# Patient Record
Sex: MALE | Race: WHITE | Employment: OTHER | URBAN - METROPOLITAN AREA
[De-identification: names, ages, dates, MRNs, and addresses within clinical notes are randomized per-mention and may not be internally consistent; named-entity substitution may affect disease eponyms.]

---

## 2017-01-01 ENCOUNTER — LAB ENCOUNTER (OUTPATIENT)
Dept: LAB | Age: 72
End: 2017-01-01
Attending: INTERNAL MEDICINE
Payer: MEDICARE

## 2017-01-01 DIAGNOSIS — C83.38 DIFFUSE LARGE B-CELL LYMPHOMA OF LYMPH NODES OF MULTIPLE REGIONS (HCC): ICD-10-CM

## 2017-01-01 PROCEDURE — 88185 FLOWCYTOMETRY/TC ADD-ON: CPT | Performed by: INTERNAL MEDICINE

## 2017-01-01 PROCEDURE — 88237 TISSUE CULTURE BONE MARROW: CPT

## 2017-01-01 PROCEDURE — 88307 TISSUE EXAM BY PATHOLOGIST: CPT | Performed by: INTERNAL MEDICINE

## 2017-01-01 PROCEDURE — 88184 FLOWCYTOMETRY/ TC 1 MARKER: CPT | Performed by: INTERNAL MEDICINE

## 2017-01-01 PROCEDURE — 88341 IMHCHEM/IMCYTCHM EA ADD ANTB: CPT

## 2017-01-01 PROCEDURE — 88275 CYTOGENETICS 100-300: CPT | Performed by: INTERNAL MEDICINE

## 2017-01-01 PROCEDURE — 88271 CYTOGENETICS DNA PROBE: CPT | Performed by: INTERNAL MEDICINE

## 2017-01-01 PROCEDURE — 88342 IMHCHEM/IMCYTCHM 1ST ANTB: CPT | Performed by: INTERNAL MEDICINE

## 2017-01-01 PROCEDURE — 88341 IMHCHEM/IMCYTCHM EA ADD ANTB: CPT | Performed by: INTERNAL MEDICINE

## 2017-01-01 PROCEDURE — 36415 COLL VENOUS BLD VENIPUNCTURE: CPT | Performed by: INTERNAL MEDICINE

## 2017-01-01 PROCEDURE — 88313 SPECIAL STAINS GROUP 2: CPT

## 2017-01-01 PROCEDURE — 85097 BONE MARROW INTERPRETATION: CPT

## 2017-01-01 PROCEDURE — 88311 DECALCIFY TISSUE: CPT

## 2017-01-01 PROCEDURE — 88342 IMHCHEM/IMCYTCHM 1ST ANTB: CPT

## 2017-01-01 PROCEDURE — 88305 TISSUE EXAM BY PATHOLOGIST: CPT

## 2017-01-01 PROCEDURE — 88264 CHROMOSOME ANALYSIS 20-25: CPT

## 2017-03-21 VITALS — WEIGHT: 150 LBS | BODY MASS INDEX: 23.54 KG/M2 | HEIGHT: 67 IN

## 2017-03-21 RX ORDER — FLUORIDE TOOTHPASTE
2 TOOTHPASTE DENTAL
COMMUNITY

## 2017-03-23 ENCOUNTER — HOSPITAL ENCOUNTER (OUTPATIENT)
Dept: INTERVENTIONAL RADIOLOGY/VASCULAR | Facility: HOSPITAL | Age: 72
Discharge: HOME | End: 2017-03-23
Attending: SURGERY
Payer: MEDICARE

## 2017-03-30 ENCOUNTER — HOSPITAL ENCOUNTER (OUTPATIENT)
Dept: INTERVENTIONAL RADIOLOGY/VASCULAR | Facility: HOSPITAL | Age: 72
Discharge: HOME | End: 2017-03-30
Attending: SURGERY | Admitting: SURGERY
Payer: MEDICARE

## 2017-03-30 VITALS
DIASTOLIC BLOOD PRESSURE: 74 MMHG | RESPIRATION RATE: 23 BRPM | TEMPERATURE: 97 F | SYSTOLIC BLOOD PRESSURE: 136 MMHG | HEART RATE: 70 BPM | OXYGEN SATURATION: 95 %

## 2017-03-30 DIAGNOSIS — I73.9 CLAUDICATION (HCC): ICD-10-CM

## 2017-03-30 PROCEDURE — 047J3DZ DILATION OF LEFT EXTERNAL ILIAC ARTERY WITH INTRALUMINAL DEVICE, PERCUTANEOUS APPROACH: ICD-10-PCS | Performed by: SURGERY

## 2017-03-30 PROCEDURE — 99153 MOD SED SAME PHYS/QHP EA: CPT

## 2017-03-30 PROCEDURE — 76942 ECHO GUIDE FOR BIOPSY: CPT

## 2017-03-30 PROCEDURE — 93005 ELECTROCARDIOGRAM TRACING: CPT

## 2017-03-30 PROCEDURE — 93010 ELECTROCARDIOGRAM REPORT: CPT | Performed by: INTERNAL MEDICINE

## 2017-03-30 PROCEDURE — 37221 HC TRANSLUMINAL ANGIOPLASTY W STENT ILIAC UNILAT INIT: CPT

## 2017-03-30 PROCEDURE — 99152 MOD SED SAME PHYS/QHP 5/>YRS: CPT

## 2017-03-30 RX ORDER — CLOPIDOGREL BISULFATE 75 MG/1
TABLET ORAL
Status: COMPLETED
Start: 2017-03-30 | End: 2017-03-30

## 2017-03-30 RX ORDER — MIDAZOLAM HYDROCHLORIDE 1 MG/ML
INJECTION INTRAMUSCULAR; INTRAVENOUS
Status: COMPLETED
Start: 2017-03-30 | End: 2017-03-30

## 2017-03-30 RX ORDER — HEPARIN SODIUM 5000 [USP'U]/ML
INJECTION, SOLUTION INTRAVENOUS; SUBCUTANEOUS
Status: COMPLETED
Start: 2017-03-30 | End: 2017-03-30

## 2017-03-30 RX ORDER — SODIUM CHLORIDE 9 MG/ML
INJECTION, SOLUTION INTRAVENOUS CONTINUOUS
Status: DISCONTINUED | OUTPATIENT
Start: 2017-03-30 | End: 2017-03-30

## 2017-03-30 RX ORDER — LIDOCAINE HYDROCHLORIDE 10 MG/ML
INJECTION, SOLUTION INFILTRATION; PERINEURAL
Status: COMPLETED
Start: 2017-03-30 | End: 2017-03-30

## 2017-03-30 NOTE — OPERATIVE REPORT
[facility]  Operative Report     Abdelrahman Puls Location: OR   CSN [id number] MRN [id number]   Admission Date 3/30/2017 Operation Date 3/30/2017   Attending Physician Nino Romo MD Operating Physician Liv Carroll MD       Pre-Operative Diag stenosis. I then selected the left external iliac and used a Glidewire and advanced a wire across the stenosis. I advanced a glide catheter the level of the superficial femoral artery over the Glidewire and exchanged it for a J-wire.   Patient was then he

## 2017-03-30 NOTE — BRIEF OP NOTE
[facility] [address] [facility]  Brief Op Note     Ane Holdrege Location: OR   [facility] [id number] MRN [id number]   Admission Date 3/30/2017 Operation Date 3/30/2017   Attending Physician Adriane Mclaughlin MD Operating Physician Lissy Blount MD

## 2017-03-30 NOTE — PROGRESS NOTES
Received patient from cath lab. VSS. Right groin SDI. Denies pain.  at bedside spoke with pt and family.  Pedals palable

## 2017-03-30 NOTE — PROGRESS NOTES
Discharge to home. Information about plavix given to patient and family, info about perclose along with wound care instructions. To [place] per [employer], family driving home.

## 2017-05-15 NOTE — H&P
[facility]  Vascular Surgery  Kuldeep Leary Patient Status:  Outpatient in a Bed    1945 MRN [id number]   Location [facility] Attending No att. providers found   [address] Day # 0 PCP Lynne Fowler MD       History Comment Procedure: ESOPHAGOGASTRODUODENOSCOPY, COLONOSCOPY, POSSIBLE BIOPSY, POSSIBLE POLYPECTOMY 65955,43310;  Surgeon: Rodney Gentile MD;  Location: [place] [address]; W/DIRECTED SUBMUCOSA IN Exam:  /74 mmHg  Pulse 70  Temp(Src) 97.3 °F (36.3 °C) (Temporal)  Resp 23  SpO2 95%  GENERAL: alert and orientated X 3, well developed, well nourished, in no apparent distress  HEENT: ears and throat are clear  NECK: supple, no lymphadenopathy, thyr

## 2017-12-19 PROBLEM — C83.38 DIFFUSE LARGE B-CELL LYMPHOMA OF LYMPH NODES OF MULTIPLE REGIONS (HCC): Status: ACTIVE | Noted: 2017-01-01

## 2018-01-01 ENCOUNTER — APPOINTMENT (OUTPATIENT)
Dept: GENERAL RADIOLOGY | Facility: HOSPITAL | Age: 73
DRG: 641 | End: 2018-01-01
Attending: EMERGENCY MEDICINE
Payer: MEDICARE

## 2018-01-01 ENCOUNTER — HOSPITAL ENCOUNTER (INPATIENT)
Facility: HOSPITAL | Age: 73
LOS: 2 days | Discharge: HOME | DRG: 378 | End: 2018-01-01
Attending: HOSPITALIST | Admitting: HOSPITALIST
Payer: MEDICARE

## 2018-01-01 ENCOUNTER — ANESTHESIA EVENT (OUTPATIENT)
Dept: CARDIAC SURGERY | Facility: HOSPITAL | Age: 73
End: 2018-01-01

## 2018-01-01 ENCOUNTER — HOSPITAL ENCOUNTER (OUTPATIENT)
Dept: ULTRASOUND IMAGING | Facility: HOSPITAL | Age: 73
Discharge: HOME | End: 2018-01-01
Attending: INTERNAL MEDICINE
Payer: MEDICARE

## 2018-01-01 ENCOUNTER — SURGERY (OUTPATIENT)
Age: 73
End: 2018-01-01

## 2018-01-01 ENCOUNTER — ANESTHESIA EVENT (OUTPATIENT)
Dept: ENDOSCOPY | Facility: HOSPITAL | Age: 73
End: 2018-01-01

## 2018-01-01 ENCOUNTER — ANESTHESIA (OUTPATIENT)
Dept: ENDOSCOPY | Facility: HOSPITAL | Age: 73
End: 2018-01-01

## 2018-01-01 ENCOUNTER — ANESTHESIA (OUTPATIENT)
Dept: CARDIAC SURGERY | Facility: HOSPITAL | Age: 73
End: 2018-01-01

## 2018-01-01 ENCOUNTER — HOSPITAL ENCOUNTER (EMERGENCY)
Facility: HOSPITAL | Age: 73
End: 2018-01-01
Attending: STUDENT IN AN ORGANIZED HEALTH CARE EDUCATION/TRAINING PROGRAM
Payer: MEDICARE

## 2018-01-01 ENCOUNTER — APPOINTMENT (OUTPATIENT)
Dept: LAB | Facility: HOSPITAL | Age: 73
End: 2018-01-01
Attending: INTERNAL MEDICINE
Payer: MEDICARE

## 2018-01-01 ENCOUNTER — HOSPITAL ENCOUNTER (INPATIENT)
Facility: HOSPITAL | Age: 73
LOS: 1 days | Discharge: HOME | DRG: 269 | End: 2018-01-01
Attending: SURGERY | Admitting: SURGERY
Payer: MEDICARE

## 2018-01-01 ENCOUNTER — HOSPITAL ENCOUNTER (INPATIENT)
Facility: HOSPITAL | Age: 73
LOS: 1 days | Discharge: HOME | DRG: 641 | End: 2018-01-01
Attending: EMERGENCY MEDICINE | Admitting: INTERNAL MEDICINE
Payer: MEDICARE

## 2018-01-01 VITALS
HEART RATE: 83 BPM | RESPIRATION RATE: 22 BRPM | OXYGEN SATURATION: 90 % | SYSTOLIC BLOOD PRESSURE: 114 MMHG | DIASTOLIC BLOOD PRESSURE: 48 MMHG | TEMPERATURE: 98 F

## 2018-01-01 VITALS
RESPIRATION RATE: 18 BRPM | BODY MASS INDEX: 23.77 KG/M2 | TEMPERATURE: 98 F | HEIGHT: 66 IN | WEIGHT: 147.88 LBS | SYSTOLIC BLOOD PRESSURE: 120 MMHG | HEART RATE: 100 BPM | DIASTOLIC BLOOD PRESSURE: 53 MMHG | OXYGEN SATURATION: 97 %

## 2018-01-01 VITALS
BODY MASS INDEX: 25.22 KG/M2 | RESPIRATION RATE: 28 BRPM | HEART RATE: 86 BPM | HEIGHT: 66 IN | OXYGEN SATURATION: 97 % | TEMPERATURE: 98 F | SYSTOLIC BLOOD PRESSURE: 101 MMHG | DIASTOLIC BLOOD PRESSURE: 60 MMHG | WEIGHT: 156.94 LBS

## 2018-01-01 VITALS
RESPIRATION RATE: 22 BRPM | TEMPERATURE: 97 F | HEART RATE: 82 BPM | OXYGEN SATURATION: 98 % | BODY MASS INDEX: 23.14 KG/M2 | WEIGHT: 144 LBS | HEIGHT: 66 IN | SYSTOLIC BLOOD PRESSURE: 155 MMHG | DIASTOLIC BLOOD PRESSURE: 88 MMHG

## 2018-01-01 DIAGNOSIS — C83.38 DIFFUSE LARGE B-CELL LYMPHOMA OF LYMPH NODES OF MULTIPLE REGIONS (HCC): Primary | ICD-10-CM

## 2018-01-01 DIAGNOSIS — E86.0 DEHYDRATION: Primary | ICD-10-CM

## 2018-01-01 DIAGNOSIS — C83.38 DIFFUSE LARGE B-CELL LYMPHOMA OF LYMPH NODES OF MULTIPLE REGIONS (HCC): ICD-10-CM

## 2018-01-01 DIAGNOSIS — E61.1 IRON DEFICIENCY: Primary | ICD-10-CM

## 2018-01-01 DIAGNOSIS — I46.9 CARDIOPULMONARY ARREST (HCC): Primary | ICD-10-CM

## 2018-01-01 LAB
ALBUMIN SERPL-MCNC: 3.1 G/DL (ref 3.5–4.8)
ALP LIVER SERPL-CCNC: 69 U/L (ref 45–117)
ALT SERPL-CCNC: 33 U/L (ref 17–63)
ANTIBODY SCREEN: NEGATIVE
AST SERPL-CCNC: 36 U/L (ref 15–41)
ATRIAL RATE: 55 BPM
ATRIAL RATE: 56 BPM
ATRIAL RATE: 87 BPM
BASOPHILS # BLD AUTO: 0 X10(3) UL (ref 0–0.1)
BASOPHILS # BLD AUTO: 0 X10(3) UL (ref 0–0.1)
BASOPHILS NFR BLD AUTO: 0 %
BASOPHILS NFR BLD AUTO: 0 %
BILIRUB SERPL-MCNC: 0.4 MG/DL (ref 0.1–2)
BILIRUB UR QL STRIP.AUTO: NEGATIVE
BLOOD TYPE BARCODE: 6200
BUN BLD-MCNC: 20 MG/DL (ref 8–20)
BUN BLD-MCNC: 30 MG/DL (ref 8–20)
BUN BLD-MCNC: 33 MG/DL (ref 8–20)
CALCIUM BLD-MCNC: 7.9 MG/DL (ref 8.3–10.3)
CALCIUM BLD-MCNC: 7.9 MG/DL (ref 8.3–10.3)
CALCIUM BLD-MCNC: 8.9 MG/DL (ref 8.3–10.3)
CHLORIDE: 107 MMOL/L (ref 101–111)
CHLORIDE: 111 MMOL/L (ref 101–111)
CHLORIDE: 99 MMOL/L (ref 101–111)
CO2: 22 MMOL/L (ref 22–32)
CO2: 23 MMOL/L (ref 22–32)
CO2: 24 MMOL/L (ref 22–32)
COLOR UR AUTO: YELLOW
CREAT BLD-MCNC: 0.67 MG/DL (ref 0.7–1.3)
CREAT BLD-MCNC: 0.81 MG/DL (ref 0.7–1.3)
CREAT BLD-MCNC: 1.16 MG/DL (ref 0.7–1.3)
EOSINOPHIL # BLD AUTO: 0.08 X10(3) UL (ref 0–0.3)
EOSINOPHIL # BLD AUTO: 0.08 X10(3) UL (ref 0–0.3)
EOSINOPHIL NFR BLD AUTO: 0.9 %
EOSINOPHIL NFR BLD AUTO: 1.1 %
ERYTHROCYTE [DISTWIDTH] IN BLOOD BY AUTOMATED COUNT: 12.7 % (ref 11.5–16)
ERYTHROCYTE [DISTWIDTH] IN BLOOD BY AUTOMATED COUNT: 12.9 % (ref 11.5–16)
ERYTHROCYTE [DISTWIDTH] IN BLOOD BY AUTOMATED COUNT: 13.2 % (ref 11.5–16)
ERYTHROCYTE [DISTWIDTH] IN BLOOD BY AUTOMATED COUNT: 13.2 % (ref 11.5–16)
GLUCOSE BLD-MCNC: 117 MG/DL (ref 70–99)
GLUCOSE BLD-MCNC: 169 MG/DL (ref 70–99)
GLUCOSE BLD-MCNC: 89 MG/DL (ref 70–99)
GLUCOSE BLD-MCNC: 99 MG/DL (ref 70–99)
GLUCOSE UR STRIP.AUTO-MCNC: 50 MG/DL
HCT VFR BLD AUTO: 28.8 % (ref 37–53)
HCT VFR BLD AUTO: 29.2 % (ref 37–53)
HCT VFR BLD AUTO: 32.1 % (ref 37–53)
HCT VFR BLD AUTO: 37.1 % (ref 37–53)
HGB BLD-MCNC: 10.7 G/DL (ref 13–17)
HGB BLD-MCNC: 12.2 G/DL (ref 13–17)
HGB BLD-MCNC: 9.7 G/DL (ref 13–17)
HGB BLD-MCNC: 9.8 G/DL (ref 13–17)
IMMATURE GRANULOCYTE COUNT: 0.02 X10(3) UL (ref 0–1)
IMMATURE GRANULOCYTE COUNT: 0.03 X10(3) UL (ref 0–1)
IMMATURE GRANULOCYTE RATIO %: 0.3 %
IMMATURE GRANULOCYTE RATIO %: 0.3 %
INR BLD: 1.18 (ref 0.89–1.11)
ISTAT ACTIVATED CLOTTING TIME: 125 SECONDS (ref 74–137)
ISTAT BLOOD GAS BASE EXCESS: 0 MMOL/L
ISTAT BLOOD GAS HCO3: 25.7 MEQ/L (ref 22–26)
ISTAT BLOOD GAS O2 SATURATION: 100 % (ref 92–100)
ISTAT BLOOD GAS PCO2: 45 MMHG (ref 35–45)
ISTAT BLOOD GAS PH: 7.37 (ref 7.35–7.45)
ISTAT BLOOD GAS PO2: 346 MMHG (ref 80–105)
ISTAT BLOOD GAS TCO2: 27 MMOL/L (ref 22–32)
ISTAT HEMATOCRIT: 32 % (ref 37–53)
ISTAT IONIZED CALCIUM: 1.19 MMOL/L (ref 1.12–1.32)
ISTAT POTASSIUM: 4.1 MMOL/L (ref 3.6–5.1)
ISTAT SODIUM: 146 MMOL/L (ref 136–144)
KETONES UR STRIP.AUTO-MCNC: NEGATIVE MG/DL
LACTIC ACID: 1.6 MMOL/L (ref 0.5–2)
LACTIC ACID: 2 MMOL/L (ref 0.5–2)
LACTIC ACID: 3.9 MMOL/L (ref 0.5–2)
LEUKOCYTE ESTERASE UR QL STRIP.AUTO: NEGATIVE
LYMPHOCYTES # BLD AUTO: 0.53 X10(3) UL (ref 0.9–4)
LYMPHOCYTES # BLD AUTO: 0.65 X10(3) UL (ref 0.9–4)
LYMPHOCYTES NFR BLD AUTO: 7.4 %
LYMPHOCYTES NFR BLD AUTO: 7.5 %
M PROTEIN MFR SERPL ELPH: 6.6 G/DL (ref 6.1–8.3)
MCH RBC QN AUTO: 30.3 PG (ref 27–33.2)
MCH RBC QN AUTO: 31.1 PG (ref 27–33.2)
MCH RBC QN AUTO: 31.5 PG (ref 27–33.2)
MCH RBC QN AUTO: 32.2 PG (ref 27–33.2)
MCHC RBC AUTO-ENTMCNC: 32.9 G/DL (ref 31–37)
MCHC RBC AUTO-ENTMCNC: 33.2 G/DL (ref 31–37)
MCHC RBC AUTO-ENTMCNC: 33.3 G/DL (ref 31–37)
MCHC RBC AUTO-ENTMCNC: 34 G/DL (ref 31–37)
MCV RBC AUTO: 91.4 FL (ref 80–99)
MCV RBC AUTO: 92.1 FL (ref 80–99)
MCV RBC AUTO: 94.8 FL (ref 80–99)
MCV RBC AUTO: 96.7 FL (ref 80–99)
MONOCYTES # BLD AUTO: 0.03 X10(3) UL (ref 0.1–0.6)
MONOCYTES # BLD AUTO: 0.03 X10(3) UL (ref 0.1–0.6)
MONOCYTES NFR BLD AUTO: 0.3 %
MONOCYTES NFR BLD AUTO: 0.4 %
NEUTROPHIL ABS PRELIM: 6.55 X10 (3) UL (ref 1.3–6.7)
NEUTROPHIL ABS PRELIM: 7.86 X10 (3) UL (ref 1.3–6.7)
NEUTROPHILS # BLD AUTO: 6.55 X10(3) UL (ref 1.3–6.7)
NEUTROPHILS # BLD AUTO: 7.86 X10(3) UL (ref 1.3–6.7)
NEUTROPHILS NFR BLD AUTO: 90.8 %
NEUTROPHILS NFR BLD AUTO: 91 %
NITRITE UR QL STRIP.AUTO: NEGATIVE
P AXIS: 66 DEGREES
P AXIS: 75 DEGREES
P AXIS: 78 DEGREES
P-R INTERVAL: 148 MS
P-R INTERVAL: 166 MS
P-R INTERVAL: 174 MS
PH UR STRIP.AUTO: 6 [PH] (ref 4.5–8)
PLATELET # BLD AUTO: 152 10(3)UL (ref 150–450)
PLATELET # BLD AUTO: 162 10(3)UL (ref 150–450)
PLATELET # BLD AUTO: 163 10(3)UL (ref 150–450)
PLATELET # BLD AUTO: 225 10(3)UL (ref 150–450)
POTASSIUM SERPL-SCNC: 3.7 MMOL/L (ref 3.6–5.1)
POTASSIUM SERPL-SCNC: 3.8 MMOL/L (ref 3.6–5.1)
POTASSIUM SERPL-SCNC: 4.6 MMOL/L (ref 3.6–5.1)
PROT UR STRIP.AUTO-MCNC: 30 MG/DL
PSA SERPL DL<=0.01 NG/ML-MCNC: 15.1 SECONDS (ref 12–14.3)
Q-T INTERVAL: 380 MS
Q-T INTERVAL: 466 MS
Q-T INTERVAL: 472 MS
QRS DURATION: 88 MS
QRS DURATION: 92 MS
QRS DURATION: 98 MS
QTC CALCULATION (BEZET): 449 MS
QTC CALCULATION (BEZET): 451 MS
QTC CALCULATION (BEZET): 457 MS
R AXIS: 23 DEGREES
R AXIS: 49 DEGREES
R AXIS: 62 DEGREES
RBC # BLD AUTO: 3.08 X10(6)UL (ref 3.8–5.8)
RBC # BLD AUTO: 3.15 X10(6)UL (ref 3.8–5.8)
RBC # BLD AUTO: 3.32 X10(6)UL (ref 3.8–5.8)
RBC # BLD AUTO: 4.03 X10(6)UL (ref 3.8–5.8)
RBC UR QL AUTO: NEGATIVE
RED CELL DISTRIBUTION WIDTH-SD: 42.3 FL (ref 35.1–46.3)
RED CELL DISTRIBUTION WIDTH-SD: 43 FL (ref 35.1–46.3)
RED CELL DISTRIBUTION WIDTH-SD: 45.8 FL (ref 35.1–46.3)
RED CELL DISTRIBUTION WIDTH-SD: 46.6 FL (ref 35.1–46.3)
RH BLOOD TYPE: POSITIVE
SODIUM SERPL-SCNC: 134 MMOL/L (ref 136–144)
SODIUM SERPL-SCNC: 140 MMOL/L (ref 136–144)
SODIUM SERPL-SCNC: 142 MMOL/L (ref 136–144)
SP GR UR STRIP.AUTO: 1.01 (ref 1–1.03)
T AXIS: 71 DEGREES
T AXIS: 75 DEGREES
T AXIS: 76 DEGREES
TROPONIN: <0.046 NG/ML (ref ?–0.05)
UROBILINOGEN UR STRIP.AUTO-MCNC: <2 MG/DL
VENTRICULAR RATE: 55 BPM
VENTRICULAR RATE: 56 BPM
VENTRICULAR RATE: 87 BPM
WBC # BLD AUTO: 10.7 X10(3) UL (ref 4–13)
WBC # BLD AUTO: 10.7 X10(3) UL (ref 4–13)
WBC # BLD AUTO: 7.2 X10(3) UL (ref 4–13)
WBC # BLD AUTO: 8.7 X10(3) UL (ref 4–13)

## 2018-01-01 PROCEDURE — 84132 ASSAY OF SERUM POTASSIUM: CPT

## 2018-01-01 PROCEDURE — 85027 COMPLETE CBC AUTOMATED: CPT | Performed by: SURGERY

## 2018-01-01 PROCEDURE — 96361 HYDRATE IV INFUSION ADD-ON: CPT

## 2018-01-01 PROCEDURE — 88184 FLOWCYTOMETRY/ TC 1 MARKER: CPT | Performed by: INTERNAL MEDICINE

## 2018-01-01 PROCEDURE — 85347 COAGULATION TIME ACTIVATED: CPT

## 2018-01-01 PROCEDURE — C9113 INJ PANTOPRAZOLE SODIUM, VIA: HCPCS | Performed by: INTERNAL MEDICINE

## 2018-01-01 PROCEDURE — 80048 BASIC METABOLIC PNL TOTAL CA: CPT | Performed by: INTERNAL MEDICINE

## 2018-01-01 PROCEDURE — 93005 ELECTROCARDIOGRAM TRACING: CPT

## 2018-01-01 PROCEDURE — 86706 HEP B SURFACE ANTIBODY: CPT | Performed by: INTERNAL MEDICINE

## 2018-01-01 PROCEDURE — 30233N1 TRANSFUSION OF NONAUTOLOGOUS RED BLOOD CELLS INTO PERIPHERAL VEIN, PERCUTANEOUS APPROACH: ICD-10-PCS | Performed by: INTERNAL MEDICINE

## 2018-01-01 PROCEDURE — 85045 AUTOMATED RETICULOCYTE COUNT: CPT | Performed by: INTERNAL MEDICINE

## 2018-01-01 PROCEDURE — 86706 HEP B SURFACE ANTIBODY: CPT | Performed by: PHYSICIAN ASSISTANT

## 2018-01-01 PROCEDURE — 85610 PROTHROMBIN TIME: CPT

## 2018-01-01 PROCEDURE — 92950 HEART/LUNG RESUSCITATION CPR: CPT

## 2018-01-01 PROCEDURE — 5A12012 PERFORMANCE OF CARDIAC OUTPUT, SINGLE, MANUAL: ICD-10-PCS | Performed by: STUDENT IN AN ORGANIZED HEALTH CARE EDUCATION/TRAINING PROGRAM

## 2018-01-01 PROCEDURE — 87340 HEPATITIS B SURFACE AG IA: CPT | Performed by: INTERNAL MEDICINE

## 2018-01-01 PROCEDURE — 82728 ASSAY OF FERRITIN: CPT | Performed by: INTERNAL MEDICINE

## 2018-01-01 PROCEDURE — 86850 RBC ANTIBODY SCREEN: CPT | Performed by: INTERNAL MEDICINE

## 2018-01-01 PROCEDURE — 99285 EMERGENCY DEPT VISIT HI MDM: CPT

## 2018-01-01 PROCEDURE — 84295 ASSAY OF SERUM SODIUM: CPT

## 2018-01-01 PROCEDURE — 86704 HEP B CORE ANTIBODY TOTAL: CPT | Performed by: INTERNAL MEDICINE

## 2018-01-01 PROCEDURE — 83605 ASSAY OF LACTIC ACID: CPT | Performed by: EMERGENCY MEDICINE

## 2018-01-01 PROCEDURE — 82330 ASSAY OF CALCIUM: CPT

## 2018-01-01 PROCEDURE — 85025 COMPLETE CBC W/AUTO DIFF WBC: CPT | Performed by: INTERNAL MEDICINE

## 2018-01-01 PROCEDURE — 88172 CYTP DX EVAL FNA 1ST EA SITE: CPT | Performed by: INTERNAL MEDICINE

## 2018-01-01 PROCEDURE — 85025 COMPLETE CBC W/AUTO DIFF WBC: CPT | Performed by: EMERGENCY MEDICINE

## 2018-01-01 PROCEDURE — 80053 COMPREHEN METABOLIC PANEL: CPT | Performed by: INTERNAL MEDICINE

## 2018-01-01 PROCEDURE — 86920 COMPATIBILITY TEST SPIN: CPT

## 2018-01-01 PROCEDURE — 83540 ASSAY OF IRON: CPT | Performed by: INTERNAL MEDICINE

## 2018-01-01 PROCEDURE — 87086 URINE CULTURE/COLONY COUNT: CPT | Performed by: EMERGENCY MEDICINE

## 2018-01-01 PROCEDURE — 86704 HEP B CORE ANTIBODY TOTAL: CPT | Performed by: PHYSICIAN ASSISTANT

## 2018-01-01 PROCEDURE — 88305 TISSUE EXAM BY PATHOLOGIST: CPT | Performed by: INTERNAL MEDICINE

## 2018-01-01 PROCEDURE — 87040 BLOOD CULTURE FOR BACTERIA: CPT | Performed by: EMERGENCY MEDICINE

## 2018-01-01 PROCEDURE — 86901 BLOOD TYPING SEROLOGIC RH(D): CPT | Performed by: INTERNAL MEDICINE

## 2018-01-01 PROCEDURE — 0DJD8ZZ INSPECTION OF LOWER INTESTINAL TRACT, VIA NATURAL OR ARTIFICIAL OPENING ENDOSCOPIC: ICD-10-PCS | Performed by: INTERNAL MEDICINE

## 2018-01-01 PROCEDURE — 87081 CULTURE SCREEN ONLY: CPT | Performed by: SURGERY

## 2018-01-01 PROCEDURE — 86901 BLOOD TYPING SEROLOGIC RH(D): CPT | Performed by: SURGERY

## 2018-01-01 PROCEDURE — B41CYZZ FLUOROSCOPY OF PELVIC ARTERIES USING OTHER CONTRAST: ICD-10-PCS | Performed by: SURGERY

## 2018-01-01 PROCEDURE — 36415 COLL VENOUS BLD VENIPUNCTURE: CPT

## 2018-01-01 PROCEDURE — 0DJ08ZZ INSPECTION OF UPPER INTESTINAL TRACT, VIA NATURAL OR ARTIFICIAL OPENING ENDOSCOPIC: ICD-10-PCS | Performed by: INTERNAL MEDICINE

## 2018-01-01 PROCEDURE — 93010 ELECTROCARDIOGRAM REPORT: CPT

## 2018-01-01 PROCEDURE — 76942 ECHO GUIDE FOR BIOPSY: CPT | Performed by: INTERNAL MEDICINE

## 2018-01-01 PROCEDURE — 85610 PROTHROMBIN TIME: CPT | Performed by: SURGERY

## 2018-01-01 PROCEDURE — 84443 ASSAY THYROID STIM HORMONE: CPT | Performed by: INTERNAL MEDICINE

## 2018-01-01 PROCEDURE — 36430 TRANSFUSION BLD/BLD COMPNT: CPT

## 2018-01-01 PROCEDURE — 86900 BLOOD TYPING SEROLOGIC ABO: CPT | Performed by: INTERNAL MEDICINE

## 2018-01-01 PROCEDURE — B410YZZ FLUOROSCOPY OF ABDOMINAL AORTA USING OTHER CONTRAST: ICD-10-PCS | Performed by: SURGERY

## 2018-01-01 PROCEDURE — 80053 COMPREHEN METABOLIC PANEL: CPT | Performed by: EMERGENCY MEDICINE

## 2018-01-01 PROCEDURE — 87340 HEPATITIS B SURFACE AG IA: CPT | Performed by: PHYSICIAN ASSISTANT

## 2018-01-01 PROCEDURE — 83550 IRON BINDING TEST: CPT | Performed by: INTERNAL MEDICINE

## 2018-01-01 PROCEDURE — 86850 RBC ANTIBODY SCREEN: CPT | Performed by: SURGERY

## 2018-01-01 PROCEDURE — S0028 INJECTION, FAMOTIDINE, 20 MG: HCPCS | Performed by: SURGERY

## 2018-01-01 PROCEDURE — 81001 URINALYSIS AUTO W/SCOPE: CPT | Performed by: EMERGENCY MEDICINE

## 2018-01-01 PROCEDURE — 38505 NEEDLE BIOPSY LYMPH NODES: CPT | Performed by: INTERNAL MEDICINE

## 2018-01-01 PROCEDURE — 88307 TISSUE EXAM BY PATHOLOGIST: CPT | Performed by: INTERNAL MEDICINE

## 2018-01-01 PROCEDURE — 04V03DZ RESTRICTION OF ABDOMINAL AORTA WITH INTRALUMINAL DEVICE, PERCUTANEOUS APPROACH: ICD-10-PCS | Performed by: SURGERY

## 2018-01-01 PROCEDURE — 93010 ELECTROCARDIOGRAM REPORT: CPT | Performed by: INTERNAL MEDICINE

## 2018-01-01 PROCEDURE — 96360 HYDRATION IV INFUSION INIT: CPT

## 2018-01-01 PROCEDURE — 88185 FLOWCYTOMETRY/TC ADD-ON: CPT | Performed by: INTERNAL MEDICINE

## 2018-01-01 PROCEDURE — 85014 HEMATOCRIT: CPT

## 2018-01-01 PROCEDURE — 36415 COLL VENOUS BLD VENIPUNCTURE: CPT | Performed by: SURGERY

## 2018-01-01 PROCEDURE — 84484 ASSAY OF TROPONIN QUANT: CPT | Performed by: EMERGENCY MEDICINE

## 2018-01-01 PROCEDURE — 88342 IMHCHEM/IMCYTCHM 1ST ANTB: CPT | Performed by: INTERNAL MEDICINE

## 2018-01-01 PROCEDURE — 88173 CYTOPATH EVAL FNA REPORT: CPT | Performed by: INTERNAL MEDICINE

## 2018-01-01 PROCEDURE — 86900 BLOOD TYPING SEROLOGIC ABO: CPT | Performed by: SURGERY

## 2018-01-01 PROCEDURE — 82803 BLOOD GASES ANY COMBINATION: CPT

## 2018-01-01 PROCEDURE — 83735 ASSAY OF MAGNESIUM: CPT | Performed by: INTERNAL MEDICINE

## 2018-01-01 PROCEDURE — 88341 IMHCHEM/IMCYTCHM EA ADD ANTB: CPT | Performed by: INTERNAL MEDICINE

## 2018-01-01 PROCEDURE — 80048 BASIC METABOLIC PNL TOTAL CA: CPT | Performed by: SURGERY

## 2018-01-01 PROCEDURE — 85027 COMPLETE CBC AUTOMATED: CPT

## 2018-01-01 PROCEDURE — 82607 VITAMIN B-12: CPT | Performed by: INTERNAL MEDICINE

## 2018-01-01 PROCEDURE — 71045 X-RAY EXAM CHEST 1 VIEW: CPT | Performed by: EMERGENCY MEDICINE

## 2018-01-01 RX ORDER — MORPHINE SULFATE 2 MG/ML
2 INJECTION, SOLUTION INTRAMUSCULAR; INTRAVENOUS EVERY 4 HOURS PRN
Status: DISCONTINUED | OUTPATIENT
Start: 2018-01-01 | End: 2018-01-01

## 2018-01-01 RX ORDER — ASPIRIN 325 MG
325 TABLET, DELAYED RELEASE (ENTERIC COATED) ORAL DAILY
COMMUNITY

## 2018-01-01 RX ORDER — HYDROCODONE BITARTRATE AND ACETAMINOPHEN 5; 325 MG/1; MG/1
1 TABLET ORAL EVERY 4 HOURS PRN
Qty: 30 TABLET | Refills: 0 | Status: SHIPPED | OUTPATIENT
Start: 2018-01-01 | End: 2018-01-01

## 2018-01-01 RX ORDER — MAGNESIUM CARB/ALUMINUM HYDROX 105-160MG
296 TABLET,CHEWABLE ORAL ONCE
Status: COMPLETED | OUTPATIENT
Start: 2018-01-01 | End: 2018-01-01

## 2018-01-01 RX ORDER — DEXTROSE MONOHYDRATE 25 G/50ML
50 INJECTION, SOLUTION INTRAVENOUS
Status: DISCONTINUED | OUTPATIENT
Start: 2018-01-01 | End: 2018-01-01 | Stop reason: HOSPADM

## 2018-01-01 RX ORDER — POLYETHYLENE GLYCOL 3350 17 G/17G
17 POWDER, FOR SOLUTION ORAL DAILY PRN
Status: DISCONTINUED | OUTPATIENT
Start: 2018-01-01 | End: 2018-01-01

## 2018-01-01 RX ORDER — ACETAMINOPHEN 325 MG/1
650 TABLET ORAL EVERY 6 HOURS PRN
Status: DISCONTINUED | OUTPATIENT
Start: 2018-01-01 | End: 2018-01-01

## 2018-01-01 RX ORDER — SODIUM CHLORIDE 9 MG/ML
INJECTION, SOLUTION INTRAVENOUS CONTINUOUS
Status: DISCONTINUED | OUTPATIENT
Start: 2018-01-01 | End: 2018-01-01

## 2018-01-01 RX ORDER — ENOXAPARIN SODIUM 100 MG/ML
40 INJECTION SUBCUTANEOUS DAILY
Status: DISCONTINUED | OUTPATIENT
Start: 2018-01-01 | End: 2018-01-01

## 2018-01-01 RX ORDER — POLYETHYLENE GLYCOL 3350 17 G/17G
17 POWDER, FOR SOLUTION ORAL DAILY PRN
Qty: 30 EACH | Refills: 0 | Status: SHIPPED | OUTPATIENT
Start: 2018-01-01 | End: 2018-01-01 | Stop reason: ALTCHOICE

## 2018-01-01 RX ORDER — FAMOTIDINE 10 MG/ML
20 INJECTION, SOLUTION INTRAVENOUS 2 TIMES DAILY
Status: DISCONTINUED | OUTPATIENT
Start: 2018-01-01 | End: 2018-01-01

## 2018-01-01 RX ORDER — SODIUM CHLORIDE 9 MG/ML
INJECTION, SOLUTION INTRAVENOUS ONCE
Status: COMPLETED | OUTPATIENT
Start: 2018-01-01 | End: 2018-01-01

## 2018-01-01 RX ORDER — METOCLOPRAMIDE HYDROCHLORIDE 5 MG/ML
10 INJECTION INTRAMUSCULAR; INTRAVENOUS EVERY 8 HOURS PRN
Status: DISCONTINUED | OUTPATIENT
Start: 2018-01-01 | End: 2018-01-01

## 2018-01-01 RX ORDER — FAMOTIDINE 20 MG/1
20 TABLET ORAL 2 TIMES DAILY
Status: DISCONTINUED | OUTPATIENT
Start: 2018-01-01 | End: 2018-01-01

## 2018-01-01 RX ORDER — MORPHINE SULFATE 2 MG/ML
6 INJECTION, SOLUTION INTRAMUSCULAR; INTRAVENOUS EVERY 4 HOURS PRN
Status: DISCONTINUED | OUTPATIENT
Start: 2018-01-01 | End: 2018-01-01

## 2018-01-01 RX ORDER — MULTIVITAMIN WITH FOLIC ACID 400 MCG
1 TABLET ORAL DAILY
COMMUNITY

## 2018-01-01 RX ORDER — SODIUM CHLORIDE, SODIUM LACTATE, POTASSIUM CHLORIDE, CALCIUM CHLORIDE 600; 310; 30; 20 MG/100ML; MG/100ML; MG/100ML; MG/100ML
INJECTION, SOLUTION INTRAVENOUS CONTINUOUS
Status: DISCONTINUED | OUTPATIENT
Start: 2018-01-01 | End: 2018-01-01

## 2018-01-01 RX ORDER — BISACODYL 10 MG
10 SUPPOSITORY, RECTAL RECTAL
Status: DISCONTINUED | OUTPATIENT
Start: 2018-01-01 | End: 2018-01-01

## 2018-01-01 RX ORDER — PSEUDOEPHEDRINE HCL 30 MG
100 TABLET ORAL 2 TIMES DAILY
Qty: 60 CAPSULE | Refills: 0 | Status: SHIPPED | OUTPATIENT
Start: 2018-01-01

## 2018-01-01 RX ORDER — NALOXONE HYDROCHLORIDE 0.4 MG/ML
80 INJECTION, SOLUTION INTRAMUSCULAR; INTRAVENOUS; SUBCUTANEOUS AS NEEDED
Status: DISCONTINUED | OUTPATIENT
Start: 2018-01-01 | End: 2018-01-01 | Stop reason: HOSPADM

## 2018-01-01 RX ORDER — ONDANSETRON 2 MG/ML
4 INJECTION INTRAMUSCULAR; INTRAVENOUS EVERY 6 HOURS PRN
Status: DISCONTINUED | OUTPATIENT
Start: 2018-01-01 | End: 2018-01-01

## 2018-01-01 RX ORDER — POTASSIUM CHLORIDE 20 MEQ/1
40 TABLET, EXTENDED RELEASE ORAL ONCE
Status: COMPLETED | OUTPATIENT
Start: 2018-01-01 | End: 2018-01-01

## 2018-01-01 RX ORDER — SODIUM PHOSPHATE, DIBASIC AND SODIUM PHOSPHATE, MONOBASIC 7; 19 G/133ML; G/133ML
1 ENEMA RECTAL ONCE AS NEEDED
Status: DISCONTINUED | OUTPATIENT
Start: 2018-01-01 | End: 2018-01-01

## 2018-01-01 RX ORDER — ASPIRIN 325 MG
325 TABLET ORAL DAILY
Status: DISCONTINUED | OUTPATIENT
Start: 2018-01-01 | End: 2018-01-01

## 2018-01-01 RX ORDER — TEMAZEPAM 15 MG/1
15 CAPSULE ORAL NIGHTLY PRN
Status: DISCONTINUED | OUTPATIENT
Start: 2018-01-01 | End: 2018-01-01

## 2018-01-01 RX ORDER — MORPHINE SULFATE 2 MG/ML
4 INJECTION, SOLUTION INTRAMUSCULAR; INTRAVENOUS EVERY 4 HOURS PRN
Status: DISCONTINUED | OUTPATIENT
Start: 2018-01-01 | End: 2018-01-01

## 2018-01-01 RX ORDER — DOCUSATE SODIUM 100 MG/1
100 CAPSULE, LIQUID FILLED ORAL 2 TIMES DAILY
Status: DISCONTINUED | OUTPATIENT
Start: 2018-01-01 | End: 2018-01-01

## 2018-01-15 NOTE — ANESTHESIA POSTPROCEDURE EVALUATION
[facility] 984 Patient Status:  Inpatient   Age/Gender 75year old male MRN [id number]   Location [address] [place] [address] Attending Mike Joy MD   [address] Day # 0 PCP Ab Lombardo MD       Anesthesia Post-op Not

## 2018-01-15 NOTE — PLAN OF CARE
Patient to room 5934 s/p AAA endograft. Drowsy and confused to time and situation upon arrival. Denies pain. Bilateral groin sites with dermabond, clean, dry intact. No hematoma noted.  Dr. Douglass Knock at bedside and informed this RN of palpable enlarged lymph n

## 2018-01-15 NOTE — BRIEF OP NOTE
Pre-Operative Diagnosis:  abdominal aortic aneurysm     Post-Operative Diagnosis: same     Procedure Performed:   1. U/S perc access left and right common femoral arteries with preclose technique  2. Aortogram  3.  Endovascular aneurysm repair with follo

## 2018-01-15 NOTE — ANESTHESIA PREPROCEDURE EVALUATION
PRE-OP EVALUATION    Patient Name: Sarthak Yeager    Pre-op Diagnosis:  abdominal aortic aneurysm    Procedure(s):  endovascular abdominal aortic aneurysm  SA pending    Surgeon(s) and Role:     Zoltan Galicai MD - Primary    Pre-op vitals reviewed. BIOPSY, POSSIBLE                POLYPECTOMY 42519,99423;  Surgeon: Josiah Mays MD;  Location: [address] [place] [address]  7/19/2011: Whitney Roberson      Comment: Performed by Gloria Zamora at [place] [address] 01/08/2018   MCHC 33.4 01/08/2018   RDW 13.2 01/08/2018    01/08/2018       Lab Results  Component Value Date    01/08/2018   K 4.4 01/08/2018    01/08/2018   CO2 27.9 01/08/2018   BUN 26 (H) 01/08/2018   CREATSERUM 1.04 01/08/2018   GLU

## 2018-01-15 NOTE — H&P
Patient seen and examined. No new update. We will proceed with endovascular repair. Patient with lymphoma require chemotherapy. Indications, risks, benefits discussed.

## 2018-01-16 NOTE — PLAN OF CARE
Assumed patient care this am. Patient alert, oriented x4. Denies pain. Bilateral groin incisions intact. Up to chair for meals. Ambulating in [place]. Voiding. Discharge instructions reviewed with patient and family.  Peripheral iv's d/c'd with no complication

## 2018-01-16 NOTE — PLAN OF CARE
CARDIOVASCULAR - ADULT    • Maintains optimal cardiac output and hemodynamic stability Progressing        Impaired Activities of Daily Living    • Achieve highest/safest level of independence in self care Progressing        METABOLIC/FLUID AND ELECTROLYTES

## 2018-01-16 NOTE — CM/SW NOTE
01/16/18 1200   CM/SW Screening   Referral Source Social Work (self-referral)   [address] staff   Patient's Mental Status Alert;Oriented   Patient Status Prior to Admission   Independent with ADLs and Mobility Yes   Discharge Needs   Antici

## 2018-01-21 NOTE — OPERATIVE REPORT
Pre-Operative Diagnosis:  abdominal aortic aneurysm     Post-Operative Diagnosis: same     Procedure Performed:   1. U/S perc access left and right common femoral arteries with preclose technique  2. Aortogram  3.  Endovascular aneurysm repair with follo right. An Endurant E2S [id number] device was prepped appropriately and over the Amplatz wire the 8 Welsh sheath and pigtail were removed and the device was advanced. Pigtail was advanced up the left side.   C-arm was adjusted appropriately in a magnified a gait and a retrograde sheath injection was performed marking the gait and hypogastric artery to measure for the right docking limb. An endurant [id number] docking limb was selected for the right.   Device was prepped and advanced across the Amplatz wire on t

## 2018-02-05 PROBLEM — E86.0 DEHYDRATION: Status: ACTIVE | Noted: 2018-01-01

## 2018-02-05 NOTE — ED INITIAL ASSESSMENT (HPI)
Patient going through chemo last was Thursday, increase in weakness, fallen 3 times in the last 24 hours. Patient also sts constipation.

## 2018-02-05 NOTE — ED PROVIDER NOTES
Patient Seen in: [facility] Emergency Department    History   Patient presents with:  Fatigue (constitutional, neurologic)    Stated Complaint: Weakness     HPI    50-year-old male with history of lymphoma with last chemotherapy on Thursday presents diverticulosis, int hemorrhoids and               anal papilla; next colonoscopy in 3 yrs  6/19/2013: COLONOSCOPY,ABLATE LESION      Comment: Procedure: ESOPHAGOGASTRODUODENOSCOPY,                COLONOSCOPY, POSSIBLE BIOPSY, POSSIBLE                POLYPE Comment: ~3/day  Alcohol use: Yes              Comment: occasional      Review of Systems    Positive for stated complaint: Weakness   Other systems are as noted in HPI. Constitutional and vital signs reviewed.       All other systems re Abnormality         Status                     ---------                               -----------         ------                     CBC W/ DIFFERENTIAL[[id number]]          Abnormal            Final result                 Please view results f

## 2018-02-06 NOTE — PLAN OF CARE
GASTROINTESTINAL - ADULT    • Maintains or returns to baseline bowel function Progressing    • Maintains adequate nutritional intake (undernourished) Progressing    • Achieves appropriate nutritional intake (bariatric) Progressing        SAFETY ADULT - FAL

## 2018-02-06 NOTE — CONSULTS
[facility]  Report of Consultation    Yobany Peterson Patient Status:  Inpatient    1945 MRN [id number]   [facility] 4NW-A Attending Bj Adhikari,*   Hosp Day # 1 PCP Paula Meeks MD     REASON FOR CONSULTATION: papilla; next colonoscopy in 3 yrs  6/19/2013: COLONOSCOPY,ABLATE LESION      Comment: Procedure: ESOPHAGOGASTRODUODENOSCOPY,                COLONOSCOPY, POSSIBLE BIOPSY, POSSIBLE                POLYPECTOMY 93317,42528;  Surgeon: Dima Antony 45.00 years. He has never used smokeless tobacco. He reports that he drinks alcohol. He reports that he does not use drugs.     Allergies:  No Known Allergies    Medications:    Current Facility-Administered Medications:   •  acetaminophen (TYLENOL) tab 650 02/06/2018   HCT 28.8 02/06/2018   .0 02/06/2018   CREATSERUM 0.67 02/06/2018   BUN 30 02/06/2018    02/06/2018   K 3.7 02/06/2018    02/06/2018   CO2 24.0 02/06/2018   GLU 89 02/06/2018   CA 7.9 02/06/2018   ALB 3.1 02/05/2018   HOSPITAL [address]

## 2018-02-06 NOTE — PROGRESS NOTES
02/06/18 1528   Clinical Encounter Type   Continue Visiting ( to remain available at pager [phone], as needed/requested. )   Referral To ( received referral for Advance Directive consult.   Electronic copy of POA for Health Care is filed/HPO

## 2018-02-06 NOTE — H&P
[facility] Hospitalist History and Physical      CC: Weakness    PCP: Nitesh Shin MD    History of Present Illness: Patient is a 75year old male with PMH sig for AAA s/p endovascular repair, lymphoma currently receiving chemotherapy here with weakness.  Pablito Connelly Allergies     Past Social Hx:  Denies heavy ETOH use  Denies illicts    Fam Hx  Family History   Problem Relation Age of Onset   • Heart Disorder Father 80     MI/ SUDDEN DEATH   • Other Mother      CVA   • Hypertension Mother    • Cancer Neg        Review poor PO  - No N/V does feel that recent constipation has limited his PO intake- had BM this AM  - No CP, palpitations, has mild sinus harrison w/o evidence of heart block- monitor on tele  - No focal neuro deficits  - Hydrating with IVFs, check orthsotatics t

## 2018-02-06 NOTE — PROGRESS NOTES
NURSING ADMISSION NOTE      Patient admitted via Cart from ER accompanied by family  Member. Patient is alert and oriented, sl.  forgetful, Oriented to room. Safety precautions initiated. Bed alarm on ,  Bed in low position. Call light in reach.

## 2018-02-13 PROBLEM — R35.1 NOCTURIA: Status: ACTIVE | Noted: 2018-01-01

## 2018-03-06 PROBLEM — Z91.81 AT RISK FOR FALLING: Status: ACTIVE | Noted: 2018-01-01

## 2018-03-06 PROBLEM — M89.8X9 BONE PAIN: Status: ACTIVE | Noted: 2018-01-01

## 2018-04-17 PROBLEM — K59.03 DRUG INDUCED CONSTIPATION: Status: ACTIVE | Noted: 2018-01-01

## 2018-04-17 PROBLEM — D64.81 ANEMIA ASSOCIATED WITH CHEMOTHERAPY: Status: ACTIVE | Noted: 2018-01-01

## 2018-04-17 PROBLEM — T45.1X5A ANEMIA ASSOCIATED WITH CHEMOTHERAPY: Status: ACTIVE | Noted: 2018-01-01

## 2018-06-04 NOTE — PROCEDURES
[facility] Patient Status:  Outpatient    1945 MRN [id number]   Location [address] [place] [address] Attending Eleni Billingsley MD   Hosp Day # 0 PCP Sebastian Hernandez MD         Brief Procedure Report    Pre-Operative Diagnosi

## 2018-06-04 NOTE — IMAGING NOTE
Pt tolerated R axillary lymph node biopsy procedure well with local anesthetic. VSS on room air. Presently denies pain, tegaderm dressing is CDI. Pt and family updated on post procedure plan of care per Ultrasound Technologist and pt was released.  No signs

## 2018-06-04 NOTE — PROCEDURES
[facility]  Pre-Procedure Note    Name: Gail Membreno  MRN#: [id number]  : 1945    Procedure:  Ultrasound Guided Right Axillary Lymph Node Biopsy    Indication:  Right Axillary Lymphadenopathy.  Lymphoma    Allergies:    No Known Allergies

## 2018-10-31 PROBLEM — D64.9 ANEMIA: Status: ACTIVE | Noted: 2018-01-01

## 2018-10-31 NOTE — H&P
General Medicine H&P     No chief complaint on file. PCP: Lane Salinas MD    History of Present Illness: Patient is a 75year old male with PMH sig for DLBCL, GERD, DVT, PVD, JANES, who p/t EH as direct admit for anemia and melena.        Pt seen b in 3 yrs   • COLONOSCOPY, POSSIBLE BIOPSY, POSSIBLE POLYPECTOMY 32273 N/A 7/19/2011    Performed by Eriberto Haynes MD at [address] [facility]  6/19/2013    Procedure: ESOPHAGOGASTRODUODENOSCOPY, COLONOSCOPY, POSSIBLE BI status: Current Some Day Smoker        Years: 45.00        Types: Cigarettes      Smokeless tobacco: Never Used      Tobacco comment: ~3 cigarettes/day    Alcohol use: Yes      Comment: occasional       Fam Hx  Family History   Problem Relation Age of Onse cm). The previously seen right hilar and other axillary lymphadenopathy have resolved. There are no enlarged lymph nodes within the mediastinum or evelyn. No other mass lesions are identified.  Scattered calcification of some of the coronary arteries is demon GI), zofran, pain med as needed (denies)  -IVF after prbc if needed  -IV PPI, h/o GERD also    Weakness  -2/2 above    DLBCL  -per onc    Proph  -hold asa, SCDs given bleed        Outpatient records or previous hospital records reviewed.      [facility] hospitalis

## 2018-10-31 NOTE — PLAN OF CARE
Maintains or returns to baseline bowel function Not Progressing      Maintains hematologic stability Not Progressing    Admitted from the doctor's office with HGB of 6.9, alert and oriented and in no distress, feels fatigued and worn out for the last few d

## 2018-11-01 NOTE — PLAN OF CARE
pateint had EGD today, for colonoscopy in the am. Reviewed bowel prep with patient and mag citrate given.

## 2018-11-01 NOTE — CONSULTS
[facility]    Report of Gastroenterology Consultation    Sarah Davis Patient Status:  Inpatient    1945 MRN [id number]   [facility] 4NW-A Attending Suha Grey, *   Hosp Day # 1 PCP Mikaela Moody MD     Date CVOR   • CAROTID ENDARTERECTOMY Right    • CAROTID ENDARTERECTOMY Right 5/4/2015    Performed by Abhinav Dia MD at [facility] CVOR   • COLONOSCOPY  7/19/11  ASC    Screening: right-sided diverticulosis, int hemorrhoids; next colonoscopy in 10 years   • COLONO OTHER SURGICAL HISTORY  7/11/13    Capsule endoscopy (iron def anemia): normal   • UPPER GI ENDOSCOPY,BIOPSY  6/19/2013    Procedure: ESOPHAGOGASTRODUODENOSCOPY, COLONOSCOPY, POSSIBLE BIOPSY, POSSIBLE POLYPECTOMY 85194,33701;  Surgeon: Gustavo Silver MD or when upset, automatic defibrillator, angina, other implanted devices, irregular heart rate/palpitations, high cholesterol or triglycerides, coronary stents.   Respiratory: Denies shortness of breath, chronic/frequent hoarseness, wheezing, exposure to tub or clubbing. Skin: Warm and dry. Rectal: Normal perianal areas, no masses felt.   There was not stool to test.  Laboratory Data:  Lab Results   Component Value Date    WBC 12.5 11/01/2018    HGB 7.0 11/01/2018    HCT 21.8 11/01/2018    .0 11/01/201

## 2018-11-01 NOTE — PLAN OF CARE
No stools this am. Asymptomatic when up. Vitals are stable Hb7.0 todayl. Message left for Dr Reilly Barnhart earlier this am with no reply regarding Hb results. Pt to EGD per cart, accompanied  by family.   Spoke to Jose Enrique in GI lab, DR Zeina Tucker aware of today's Hb 7

## 2018-11-01 NOTE — H&P
History & Physical Examination    Patient Name: Graham Saldaña  MRN: [id number]  [facility]: [id number]  YOB: 1945    Diagnosis: GI bleed  Anemia  INPATIENT      Present Illness:  Graham Saldaña is a 75year old male is here GI bleed  Anemia  IN ENDARTERECTOMY Right    • CAROTID ENDARTERECTOMY Right 5/4/2015    Performed by Lexy Brunson MD at [address] [facility]   • COLONOSCOPY  7/19/11  ASC    Screening: right-sided diverticulosis, int hemorrhoids; next colonoscopy in 10 years   • COLONOSCOPY  6/19/13  A HISTORY  7/11/13    Capsule endoscopy (iron def anemia): normal   • UPPER GI ENDOSCOPY,BIOPSY  6/19/2013    Procedure: ESOPHAGOGASTRODUODENOSCOPY, COLONOSCOPY, POSSIBLE BIOPSY, POSSIBLE POLYPECTOMY 81443,60151;  Surgeon: Peyton Ortega MD;  Location: [facility]

## 2018-11-01 NOTE — PLAN OF CARE
GASTROINTESTINAL - ADULT    • Maintains or returns to baseline bowel function Progressing        HEMATOLOGIC - ADULT    • Maintains hematologic stability Progressing        Alert and oriented,  Completed 1 unit of PRBC  Without complication, denies pain, h

## 2018-11-01 NOTE — ANESTHESIA PREPROCEDURE EVALUATION
PRE-OP EVALUATION    Patient Name: Estrella Shrestha    Pre-op Diagnosis: inpatient    Procedure(s):  COLONOSCOPY    Surgeon(s) and Role:     * Fausto Flores MD - Primary    Pre-op vitals reviewed.   Temp: 98.4 °F (36.9 °C)  Pulse: 83  Resp: 20  BP: 9 as directed 2 sprays in the mouth or throat as needed. Disp:  Rfl:        Allergies: Patient has no known allergies. Anesthesia Evaluation    Patient summary reviewed.     Anesthetic Complications  (-) history of anesthetic complications         GI/Hep Location: [facility]   • COLONOSCPY, FLEXIBLE, PROXIMAL TO SPLENIC FLEXURE; W/DIRECTED SUBMUCOSA INJECTION(S), ANY SUBSTANCE  6/19/2013    Procedure: ESOPHAGOGASTRODUODENOSCOPY, COLONOSCOPY, POSSIBLE BIOPSY, POSSIBLE POLYPECTOMY 13502,84781;  (H) 10/31/2018     Lab Results   Component Value Date     11/01/2018     (L) 10/31/2018    K 3.8 11/01/2018    K 4.40 10/31/2018     11/01/2018    CL 97 (L) 10/31/2018    CO2 29.0 11/01/2018    CO2 21.2 (L) 10/31/2018    BUN 24 (H

## 2018-11-01 NOTE — OPERATIVE REPORT
OPERATIVE REPORT   PATIENT NAME: Sarthak Yeager  MRN: [id number]  DATE OF OPERATION: 11/1/2018  PREOPERATIVE DIAGNOSIS: drop in hemoglobin; anemia; melena  POSTOPERATIVE DIAGNOSIS:    1. 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   Large 5 c an updated medication list.     Thank you very much for the consultation. I really appreciate it.     Maia Torrez MD

## 2018-11-02 NOTE — H&P
History & Physical Examination    Patient Name: Linda Joyce  MRN: [id number]  CSN: [id number]  YOB: 1945    Diagnosis: GI bleed  Anemia  INPATIENT  inpatient      Present Illness:  Linda Joyce is a 75year old male is here GI bleed N/A 1/15/2018    Performed by Jasmin Iniguez MD at [address]    • CAROTID ENDARTERECTOMY Right 5/4/2015    Performed by Jasmin Iniguez MD at [address] [facility]   • COLONOSCOPY  7/19/11  ASC    Screening: right-sided diverticulosis, Fountain's esophagus (no dysplasia); next EGD in 3 years   • OTHER SURGICAL HISTORY  7/11/13    Capsule endoscopy (iron def anemia): normal   • UPPER GI ENDOSCOPY,BIOPSY  6/19/2013    Procedure: ESOPHAGOGASTRODUODENOSCOPY, COLONOSCOPY, POSSIBLE BIOPSY, POSS

## 2018-11-02 NOTE — OPERATIVE REPORT
OPERATIVE REPORT   PATIENT NAME: Nora Rush  MRN: [id number]  DATE OF OPERATION: 11/2/2018  PREOPERATIVE DIAGNOSIS: drop in hemoglobin with some melena; history of cecal AVMs; negative EGD  POSTOPERATIVE DIAGNOSES   1. Tortuous colon  2.  Pan-divertic recommendations, f/u plan and an updated medication list.   PREP Quality indicators:  Aronchick scale    EXCELLENT - small volume of clear liquid > 95% of mucosa see  GOOD  - clear liquid covering up to 25% of mucosa, but > 90% of mucosa seen  FAIR  - some

## 2018-11-02 NOTE — ANESTHESIA POSTPROCEDURE EVALUATION
[facility] 984 Patient Status:  Inpatient   Age/Gender 75year old male MRN [id number]   Location [address] [facility]. Attending Christopher Mackenzie MD   [facility] Day # 2 PCP Lynne Fowler MD       Anesthesia Post-op Note    Procedure(

## 2018-11-02 NOTE — PROGRESS NOTES
[facility] Hospitalist Progress Note     Graham Piotr Patient Status:  Inpatient    1945 MRN [id number]   [facility] 4NW-A Attending Hetal Jo MD   Hosp Day # 1 PCP Willy Benavides MD     CC: follow up    SUBJECTIVE:  Bummed that floor    Questions/concerns were discussed with patient and/or family by bedside.     Jenny Cook MD   [facility]  [phone]

## 2018-11-02 NOTE — PROGRESS NOTES
[facility] Hospitalist Progress Note     Jose Melvin Patient Status:  Inpatient    1945 MRN [id number]   [facility] 4NW-A Attending Sagar Galicia MD   Hosp Day # 2 PCP Cece Flores MD     CC: follow up    SUBJECTIVE:  Completed p resolved    DVT with SCDs    Dispo: dc pending how patient is feeling post cscope. Ok to [employer] home if he remains asymptomatic from his anemia. F/u with Dr. Fred Hough in 1 wk and have hgb checked on 11/5.  However if feeling SOB/KEANE, LH/dizziness, etc - recommend

## 2018-11-02 NOTE — PLAN OF CARE
GASTROINTESTINAL - ADULT    • Maintains or returns to baseline bowel function Progressing        HEMATOLOGIC - ADULT    • Maintains hematologic stability Progressing          Patient A+Ox4. Denies any pain. NPO since midnight.  Up with standby assist, fall

## 2018-11-03 NOTE — PROGRESS NOTES
Patient discharged to home with wife. Explained all medications and follow-up appts. Patient verbalized his understanding of teaching. patient was able to tolerate a regular diet before discharged.

## 2018-11-17 NOTE — PROGRESS NOTES
11/17/18 0109   Clinical Encounter Type   Visited With Patient and family together;Health care provider   Routine Visit ( responding to [facility] Arrest page per protocol.)   Continue Visiting No   Crisis Visit Trauma;ED;Death   [place] Encounter

## 2018-11-17 NOTE — ED NOTES
EMS run sheet faxed to deputy Renetta Phillips at [employer] office. She was on call for the 's office. Body to the enid.

## 2018-11-17 NOTE — CODE DOCUMENTATION
Life band in place from lisle/woodridge, respiratory at bedside bagging IO per lisle/woodridge to left shoulder

## 2018-11-17 NOTE — ED NOTES
Matteo Levi 's office called they are closed instructed to call sherriff's office and have them page the deputy on duty. They took my # and will have the deputy call me back.

## 2018-11-17 NOTE — ED PROVIDER NOTES
Patient Seen in: [facility] Emergency Department    History   Patient presents with:  Cardiac Arrest (cardiovascular)    Stated Complaint: unresponsive 5 rounds epi     HPI    Patient is a 77-year-old male presenting to the emergency department via Laterality Date   • ABDOMINAL AORTIC ANEURYSM ENDOSCOPIC N/A 1/15/2018    Performed by Joao Gunderson MD at [address] Right    • CAROTID ENDARTERECTOMY Right 5/4/2015    Performed by Joao Gunderson MD at [address] [facility] [facility] other systems reviewed and negative except as noted above.     Physical Exam     ED Triage Vitals [11/16/18 1798]   BP (!) 0/0   Pulse (!) 0   Resp (!) 0   Temp    Temp src    SpO2 (!) 0 %   O2 Device        Current:BP (!) 0/0   Pulse (!) 0   Resp (!) 0   S

## 2018-11-17 NOTE — ED NOTES
[address] [facility] patient is not a potential donor of any kind and body can be released to [facility].  Reference # [id number]

## 2018-11-17 NOTE — ED NOTES
[facility] [facility] Kathleen Summers called back. She has released the body with no need for an autopsy. She would like a run sheet faxed to her as soon as EMS generates one.

## 2018-11-21 NOTE — DISCHARGE SUMMARY
General Medicine Discharge Summary     Patient ID:  Akila Parkinson  75year old  1/2/1945    Admit date: 10/31/2018    Discharge date and time: 11/2/2018  6:26 PM     Attending Physician: Rosa Hardin 10/25/2018  DATE OF SERVICE: 10.25.2018 CT ANGIOGRAPHY CHEST CLINICAL INDICATION:  Diffuse large b-cell lymphoma, lymph nodes of multiple sites. COMPARISON STUDY: CT chest, 12/13/2017.  TECHNIQUE: CT angiography of the chest was performed for evaluation of necessarily limited to, hypersensitivity pneumonitis, pulmonary edema, respiratory bronchiolitis, any acute interstitial disease (such as viral pneumonia or active pulmonary fibrosis) and cryptogenic organizing pneumonia ().  Due to nonspecific nature of Oral Tab EC  Take 325 mg by mouth daily. , Historical    Multiple Vitamin (TAB-A-RADAMES) Oral Tab  Take 1 tablet by mouth daily. , Historical    Mouthwashes (BIOTENE DRY MOUTH) Mouth/Throat Liquid  Use as directed 2 sprays in the mouth or throat as needed., Hi

## (undated) DEVICE — STANDARD HYPODERMIC NEEDLE,POLYPROPYLENE HUB: Brand: MONOJECT

## (undated) DEVICE — ENDOSCOPY PACK - LOWER: Brand: MEDLINE INDUSTRIES, INC.

## (undated) DEVICE — 3M™ STERI-STRIP™ REINFORCED ADHESIVE SKIN CLOSURES, R1547, 1/2 IN X 4 IN (12 MM X 100 MM), 6 STRIPS/ENVELOPE: Brand: 3M™ STERI-STRIP™

## (undated) DEVICE — 3M™ BAIR HUGGER® UNDERBODY BLANKET, FULL ACCESS, 10 PER CASE 63500: Brand: BAIR HUGGER™

## (undated) DEVICE — SOL  .9 1000ML BTL

## (undated) DEVICE — STERILE POLYISOPRENE POWDER-FREE SURGICAL GLOVES: Brand: PROTEXIS

## (undated) DEVICE — SUTURE SILK 3-0

## (undated) DEVICE — SUTURE VICRYL 3-0 CT-1

## (undated) DEVICE — GLOVE SURG SENSICARE SZ 7

## (undated) DEVICE — 3M™ RED DOT™ MONITORING ELECTRODE WITH FOAM TAPE AND STICKY GEL, 50/BAG, 20/CASE, 72/PLT 2570: Brand: RED DOT™

## (undated) DEVICE — SUTURE PROLENE 5-0 C-1

## (undated) DEVICE — SUTURE SILK 2-0 SH

## (undated) DEVICE — CHLORAPREP 26ML APPLICATOR

## (undated) DEVICE — SUTURE VICRYL 5-0 P-3

## (undated) DEVICE — 3M™ IOBAN™ 2 ANTIMICROBIAL INCISE DRAPE 6651EZ: Brand: IOBAN™ 2

## (undated) DEVICE — 1200CC GUARDIAN II: Brand: GUARDIAN

## (undated) DEVICE — ENDOSCOPY PACK UPPER: Brand: MEDLINE INDUSTRIES, INC.

## (undated) DEVICE — TRAY FOLEY 16F IC URINMETER

## (undated) DEVICE — Device: Brand: DEFENDO AIR/WATER/SUCTION AND BIOPSY VALVE

## (undated) DEVICE — GEL AQUASONIC 100 20GR

## (undated) DEVICE — 3M™ TEGADERM™ TRANSPARENT FILM DRESSING, 1626W, 4 IN X 4-3/4 IN (10 CM X 12 CM), 50 EACH/CARTON, 4 CARTON/CASE: Brand: 3M™ TEGADERM™

## (undated) DEVICE — CV PACK-LF: Brand: MEDLINE INDUSTRIES, INC.

## (undated) DEVICE — SUTURE SILK 2-0

## (undated) DEVICE — BASIC DOUBLE BASIN 1-LF: Brand: MEDLINE INDUSTRIES, INC.

## (undated) DEVICE — HEMOCLIP HORIZON SM MULTI

## (undated) DEVICE — INTENDED TO BE USED TO OCCLUDE, RETRACT AND IDENTIFY ARTERIES, VEINS, TENDONS AND NERVES IN SURGICAL PROCEDURES: Brand: STERION®  VESSEL LOOP

## (undated) DEVICE — HEMOCLIP MED 24 CLIP/CARTRIDGE

## (undated) DEVICE — MEDI-VAC SUCTION FINE CAPACITY: Brand: CARDINAL HEALTH

## (undated) DEVICE — TRANSPOSAL ULTRAFLEX DUO/QUAD ULTRA CART MANIFOLD

## (undated) DEVICE — GAUZE SPONGES,12 PLY: Brand: CURITY

## (undated) DEVICE — FILTERLINE NASAL ADULT O2/CO2

## (undated) DEVICE — GLOVE SURG TRIUMPH SZ 8

## (undated) DEVICE — SUTURE PROLENE 6-0 C-1

## (undated) NOTE — LETTER
Esmer Vides 182 6 13[facility]  [place], [address] [place] [address]    Consent for Operation  Date: __________________                                Time: _______________    1.  I authorize the performance upon Akila Parkinson the following operation:  Procedu procedure has been videotaped, the surgeon will obtain the original videotape. The hospital will not be responsible for storage or maintenance of this tape.   7. For the purpose of advancing medical education, I consent to the admittance of observers to the STATEMENTS REQUIRING INSERTION OR COMPLETION WERE FILLED IN.     Signature of Patient:   ___________________________    When the patient is a minor or mentally incompetent to give consent:  Signature of person authorized to consent for patient: ____________ supplements, and pills I can buy without a prescription (including street drugs/illegal medications). Failure to inform my anesthesiologist about these medicines may increase my risk of anesthetic complications. iv.  If I am allergic to anything or have ha Anesthesiologist Signature     Date   Time  I have discussed the procedure and information above with the patient (or patient’s representative) and answered their questions. The patient or their representative has agreed to have anesthesia services.     ___

## (undated) NOTE — LETTER
[address] [facility] FOR BLOOD OR BLOOD COMPONENTS      In the course of your treatment, it may become necessary to administer a transfusion of blood or blood components.  This form provides basic information concerning this proc explain the alternatives to you if it has not already been done. I,Manjit Tellez, have read/had read to me the above. I understand the matters bearing on the decision whether or not to authorize a transfusion of blood or blood components.  I have no qu

## (undated) NOTE — LETTER
[facility]  Lurdes [address]elle[address] [facility], [address] [place] [address]    Consent for Operation    Date: __________________    Time: _______________    1.  I authorize the performance upon Akila Parkinson the following operation:      endovascular abdominal aortic a procedure has been videotaped, the surgeon will obtain the original videotape. The hospital will not be responsible for storage or maintenance of this tape.     6. For the purpose of advancing medical education, I consent to the admittance of observers to t STATEMENTS REQUIRING INSERTION OR COMPLETION WERE FILLED IN.     Signature of Patient:   ___________________________    When the patient is a minor or mentally incompetent to give consent:  Signature of person authorized to consent for patient: ____________ supplements, and pills I can buy without a prescription (including street drugs/illegal medications). Failure to inform my anesthesiologist about these medicines may increase my risk of anesthetic complications.   · If I am allergic to anything or have had Anesthesiologist Signature     Date   Time  I have discussed the procedure and information above with the patient (or patient’s representative) and answered their questions. The patient or their representative has agreed to have anesthesia services.     ___

## (undated) NOTE — LETTER
Esmer Vides 182 6 [address]  [place], [address] [facility]    Consent for Operation  Date: __________________                                Time: _______________    1.  I authorize the performance upon Olga Vu the following operation:  Procedu procedure has been videotaped, the surgeon will obtain the original videotape. The hospital will not be responsible for storage or maintenance of this tape.   7. For the purpose of advancing medical education, I consent to the admittance of observers to the STATEMENTS REQUIRING INSERTION OR COMPLETION WERE FILLED IN.     Signature of Patient:   ___________________________    When the patient is a minor or mentally incompetent to give consent:  Signature of person authorized to consent for patient: ____________ supplements, and pills I can buy without a prescription (including street drugs/illegal medications). Failure to inform my anesthesiologist about these medicines may increase my risk of anesthetic complications. iv.  If I am allergic to anything or have ha Anesthesiologist Signature     Date   Time  I have discussed the procedure and information above with the patient (or patient’s representative) and answered their questions. The patient or their representative has agreed to have anesthesia services.     ___

## (undated) NOTE — LETTER
[facility] [address], [address] [facility]    Consent for Anesthesia   1.   IMichael agree to be cared for by an anesthesiologist, who is specially trained to monitor me and give me medicine to put me to sleep or keep me comfor vision, nerves, or muscles and in extremely rare instances death. 5. My doctor has explained to me other choices available to me for my care (alternatives).   6. Pregnant Patients (“epidural”):  I understand that the risks of having an epidural (medicine g

## (undated) NOTE — IP AVS SNAPSHOT
[facility] [place] [address] [place], [address] ~ [phone]                Discharge Summary   3/30/2017    Nicanor Chang           Admission Information        Provider Department    3/30/2017 Arben Garcia MD  Tramaine Sandoval Patient Instructions       1.  script for plavix from [address] [place] [address]  2. Remove dressing tomorrow  3. Take plavix with daily aspirin  4. Walk daily    Follow-up Information     Follow up with Otilio Meigs, MD In 3 weeks.     Specialty:  SURGERY, Additional Information       We are concerned for your overall well being:    - If you are a smoker or have smoked in the last 12 months, we encourage you to explore options for quitting.     - If you have concerns related to behavioral health issues or th call your provider or healthcare team if you have any questions regarding your medications while at home.          Other Blood Thinners     Platelet Aggregation Inhibitors    Clopidogrel Bisulfate 75 MG Oral Tab       Use: Prevent the development or progres